# Patient Record
(demographics unavailable — no encounter records)

---

## 2024-12-18 NOTE — RISK ASSESSMENT
[Has family members/adults to turn to for help] : has family members/adults to turn to for help [Grade: ____] : Grade: [unfilled] [Normal Performance] : normal performance [Normal Behavior/Attention] : normal behavior/attention [Eats regular meals including adequate fruits and vegetables] : eats regular meals including adequate fruits and vegetables [Has friends] : has friends [Home is free of violence] : home is free of violence [Has ways to cope with stress] : has ways to cope with stress [Displays self-confidence] : displays self-confidence [With Teen] : teen [Uses tobacco] : does not use tobacco [Uses drugs] : does not use drugs  [Drinks alcohol] : does not drink alcohol [Has/had oral sex] : has not had oral sex [Has had sexual intercourse] : has not had sexual intercourse [Has problems with sleep] : does not have problems with sleep [Gets depressed, anxious, or irritable/has mood swings] : does not get depressed, anxious, or irritable/has mood swings [Has thought about hurting self or considered suicide] : has not thought about hurting self or considered suicide [de-identified] : lives with mother and grandmother

## 2024-12-18 NOTE — BEGINNING OF VISIT
[Patient] : patient [] :  [[employer] ] : provided by [employer]   [Interpreters_IDNumber] : [id number]

## 2024-12-18 NOTE — DISCUSSION/SUMMARY
[FreeTextEntry1] : bh and maxwell obtained and reviewed; anticipatory guidance provided vis and consent given for varicella, hep b vaccines rtc prn for vaccines